# Patient Record
Sex: FEMALE | Race: WHITE | Employment: OTHER | URBAN - METROPOLITAN AREA
[De-identification: names, ages, dates, MRNs, and addresses within clinical notes are randomized per-mention and may not be internally consistent; named-entity substitution may affect disease eponyms.]

---

## 2023-02-22 RX ORDER — VALSARTAN 160 MG/1
160 TABLET ORAL NIGHTLY
COMMUNITY

## 2023-02-22 RX ORDER — FAMOTIDINE 40 MG/1
40 TABLET, FILM COATED ORAL 2 TIMES DAILY
COMMUNITY

## 2023-02-22 RX ORDER — HYDROCHLOROTHIAZIDE 25 MG/1
25 TABLET ORAL DAILY
COMMUNITY

## 2023-02-22 RX ORDER — ASPIRIN 81 MG/1
81 TABLET ORAL DAILY
COMMUNITY

## 2023-02-22 RX ORDER — LATANOPROST 50 UG/ML
1 SOLUTION/ DROPS OPHTHALMIC NIGHTLY
COMMUNITY

## 2023-02-22 RX ORDER — MAGNESIUM OXIDE 400 MG/1
400 TABLET ORAL DAILY
COMMUNITY

## 2023-02-22 RX ORDER — ATORVASTATIN CALCIUM 40 MG/1
40 TABLET, FILM COATED ORAL DAILY
COMMUNITY

## 2023-02-22 RX ORDER — ASCORBIC ACID 500 MG
1000 TABLET ORAL DAILY
COMMUNITY

## 2023-02-22 RX ORDER — DOXEPIN HYDROCHLORIDE 50 MG/1
50 CAPSULE ORAL NIGHTLY
COMMUNITY

## 2023-02-22 RX ORDER — ALPRAZOLAM 0.25 MG/1
0.25 TABLET ORAL 2 TIMES DAILY
COMMUNITY

## 2023-02-22 RX ORDER — AMLODIPINE BESYLATE 5 MG/1
5 TABLET ORAL DAILY
COMMUNITY

## 2023-02-22 NOTE — PROGRESS NOTES
Patient __X_ reached   _____not reached-preop instructions left on voice mail_____________      DATE_3/6/23_______ TIME__1045______ARRIVAL__0915  FEC_______      Nothing to eat or drink after midnight the night before,except for what the prep instructions call for. If you do not have the instructions or do not understand them please contact your doctors office. Follow any instructions your doctors office has given you including what medications to take the AM of your procedure and which ones to hold. You may use your inhalers - bring rescue inhalers with you DOS. If you take a long acting insulin the selina prior please cut the dose in half and take no diabetic medications that AM.Follow specific doctors office instructions regarding blood thinners and if they want you to hold and for how long. If you are on a blood thinner and have no instructions please contact the office and ask. Dress comfortably,bring your insurance card,picture ID,and a complete list of medications, including supplements. You must have a responsible adult to stay with you during the procedure,drive you home and stay with you. [facility] phone number [phone] for any questions. OTHER INSTRUCTIONS(if applicable)_____take amlodipine, xanax prn am of procedure____________________________________________________        VISITOR POLICY(subject to change)    Current visitor policy is 2 visitors per patient. No children allowed. Mask at discretion of facility. Visiting hours are 8a-8p. Overnight visitors will be at the discretion of the nurse. All policies are subject to change.

## 2023-03-06 ENCOUNTER — ANESTHESIA (OUTPATIENT)
Dept: ENDOSCOPY | Age: 78
End: 2023-03-06
Payer: MEDICARE

## 2023-03-06 ENCOUNTER — HOSPITAL ENCOUNTER (OUTPATIENT)
Age: 78
Setting detail: OUTPATIENT SURGERY
Discharge: HOME | End: 2023-03-06
Attending: INTERNAL MEDICINE | Admitting: INTERNAL MEDICINE
Payer: MEDICARE

## 2023-03-06 ENCOUNTER — ANESTHESIA EVENT (OUTPATIENT)
Dept: ENDOSCOPY | Age: 78
End: 2023-03-06
Payer: MEDICARE

## 2023-03-06 VITALS
BODY MASS INDEX: 22.02 KG/M2 | HEIGHT: 66 IN | TEMPERATURE: 97.6 F | SYSTOLIC BLOOD PRESSURE: 114 MMHG | DIASTOLIC BLOOD PRESSURE: 78 MMHG | WEIGHT: 137 LBS | RESPIRATION RATE: 16 BRPM | HEART RATE: 77 BPM | OXYGEN SATURATION: 99 %

## 2023-03-06 DIAGNOSIS — R10.9 ABDOMINAL PAIN, UNSPECIFIED ABDOMINAL LOCATION: ICD-10-CM

## 2023-03-06 PROCEDURE — 2580000003 HC RX 258: Performed by: NURSE ANESTHETIST, CERTIFIED REGISTERED

## 2023-03-06 PROCEDURE — 3700000001 HC ADD 15 MINUTES (ANESTHESIA): Performed by: INTERNAL MEDICINE

## 2023-03-06 PROCEDURE — 2709999900 HC NON-CHARGEABLE SUPPLY: Performed by: INTERNAL MEDICINE

## 2023-03-06 PROCEDURE — 3700000000 HC ANESTHESIA ATTENDED CARE: Performed by: INTERNAL MEDICINE

## 2023-03-06 PROCEDURE — 2500000003 HC RX 250 WO HCPCS: Performed by: NURSE ANESTHETIST, CERTIFIED REGISTERED

## 2023-03-06 PROCEDURE — 7100000011 HC PHASE II RECOVERY - ADDTL 15 MIN: Performed by: INTERNAL MEDICINE

## 2023-03-06 PROCEDURE — 88305 TISSUE EXAM BY PATHOLOGIST: CPT

## 2023-03-06 PROCEDURE — 3609010600 HC COLONOSCOPY POLYPECTOMY SNARE/COLD BIOPSY: Performed by: INTERNAL MEDICINE

## 2023-03-06 PROCEDURE — 6360000002 HC RX W HCPCS: Performed by: NURSE ANESTHETIST, CERTIFIED REGISTERED

## 2023-03-06 PROCEDURE — 7100000010 HC PHASE II RECOVERY - FIRST 15 MIN: Performed by: INTERNAL MEDICINE

## 2023-03-06 RX ORDER — SODIUM CHLORIDE 9 MG/ML
INJECTION, SOLUTION INTRAVENOUS CONTINUOUS PRN
Status: DISCONTINUED | OUTPATIENT
Start: 2023-03-06 | End: 2023-03-06 | Stop reason: SDUPTHER

## 2023-03-06 RX ORDER — LIDOCAINE HYDROCHLORIDE 20 MG/ML
INJECTION, SOLUTION INFILTRATION; PERINEURAL PRN
Status: DISCONTINUED | OUTPATIENT
Start: 2023-03-06 | End: 2023-03-06 | Stop reason: SDUPTHER

## 2023-03-06 RX ORDER — PROPOFOL 10 MG/ML
INJECTION, EMULSION INTRAVENOUS PRN
Status: DISCONTINUED | OUTPATIENT
Start: 2023-03-06 | End: 2023-03-06 | Stop reason: SDUPTHER

## 2023-03-06 RX ADMIN — PROPOFOL 40 MG: 10 INJECTION, EMULSION INTRAVENOUS at 10:52

## 2023-03-06 RX ADMIN — PROPOFOL 20 MG: 10 INJECTION, EMULSION INTRAVENOUS at 11:10

## 2023-03-06 RX ADMIN — PROPOFOL 20 MG: 10 INJECTION, EMULSION INTRAVENOUS at 10:57

## 2023-03-06 RX ADMIN — PROPOFOL 80 MG: 10 INJECTION, EMULSION INTRAVENOUS at 10:49

## 2023-03-06 RX ADMIN — PROPOFOL 20 MG: 10 INJECTION, EMULSION INTRAVENOUS at 10:56

## 2023-03-06 RX ADMIN — PROPOFOL 20 MG: 10 INJECTION, EMULSION INTRAVENOUS at 11:04

## 2023-03-06 RX ADMIN — SODIUM CHLORIDE: 9 INJECTION, SOLUTION INTRAVENOUS at 10:41

## 2023-03-06 RX ADMIN — PROPOFOL 20 MG: 10 INJECTION, EMULSION INTRAVENOUS at 11:01

## 2023-03-06 RX ADMIN — PROPOFOL 20 MG: 10 INJECTION, EMULSION INTRAVENOUS at 10:59

## 2023-03-06 RX ADMIN — PROPOFOL 20 MG: 10 INJECTION, EMULSION INTRAVENOUS at 11:06

## 2023-03-06 RX ADMIN — PROPOFOL 20 MG: 10 INJECTION, EMULSION INTRAVENOUS at 10:54

## 2023-03-06 RX ADMIN — PROPOFOL 20 MG: 10 INJECTION, EMULSION INTRAVENOUS at 10:55

## 2023-03-06 RX ADMIN — PROPOFOL 20 MG: 10 INJECTION, EMULSION INTRAVENOUS at 10:53

## 2023-03-06 RX ADMIN — LIDOCAINE HYDROCHLORIDE 100 MG: 20 INJECTION, SOLUTION INFILTRATION; PERINEURAL at 10:49

## 2023-03-06 RX ADMIN — PROPOFOL 20 MG: 10 INJECTION, EMULSION INTRAVENOUS at 11:07

## 2023-03-06 RX ADMIN — PROPOFOL 20 MG: 10 INJECTION, EMULSION INTRAVENOUS at 11:02

## 2023-03-06 RX ADMIN — PROPOFOL 20 MG: 10 INJECTION, EMULSION INTRAVENOUS at 10:50

## 2023-03-06 RX ADMIN — PROPOFOL 20 MG: 10 INJECTION, EMULSION INTRAVENOUS at 11:08

## 2023-03-06 RX ADMIN — PROPOFOL 10 MG: 10 INJECTION, EMULSION INTRAVENOUS at 11:12

## 2023-03-06 RX ADMIN — PROPOFOL 20 MG: 10 INJECTION, EMULSION INTRAVENOUS at 10:51

## 2023-03-06 ASSESSMENT — LIFESTYLE VARIABLES: SMOKING_STATUS: 1

## 2023-03-06 ASSESSMENT — PAIN - FUNCTIONAL ASSESSMENT: PAIN_FUNCTIONAL_ASSESSMENT: 0-10

## 2023-03-06 NOTE — ANESTHESIA POSTPROCEDURE EVALUATION
Department of Anesthesiology  Postprocedure Note    Patient: Gilberto Mares  MRN: [id number]  YOB: 1945  Date of evaluation: 3/6/2023      Procedure Summary     Date: 03/06/23 Room / Location: [address]    Anesthesia Start: 1046 Anesthesia Stop: 1119    Procedure: COLONOSCOPY POLYPECTOMY SNARE/COLD BIOPSY Diagnosis:       Abdominal pain, unspecified abdominal location      (Abdominal pain, unspecified abdominal location [R10.9])    Surgeons: Graciela Paz MD Responsible Provider: Luis Felipe Hall MD    Anesthesia Type: MAC ASA Status: 2          Anesthesia Type: No value filed.     Marcus Phase I: Marcus Score: 10    Marcus Phase II: Marcus Score: 8      Anesthesia Post Evaluation    Patient location during evaluation: PACU  Patient participation: complete - patient participated  Level of consciousness: awake and alert  Airway patency: patent  Nausea & Vomiting: no vomiting and no nausea  Complications: no  Cardiovascular status: hemodynamically stable  Respiratory status: acceptable  Hydration status: stable

## 2023-03-06 NOTE — H&P
Gastroenterology Note             Pre-operative History and Physical    Patient: Ankit Rome  : 1945  CSN:     History Obtained From:  patient and/or guardian. HISTORY OF PRESENT ILLNESS:    The patient is a 78 y.o. female  here for colonoscopy. Here for colonoscopy. Recent episode of small bowel obstruction of unclear etiology. Symptoms lasted for 24 hours or most likely viral gastroenteritis could have caused her symptoms. Last colonoscopy was more than 10 years back. Past Medical History:    Past Medical History:   Diagnosis Date    Anxiety     GERD (gastroesophageal reflux disease)     Hyperlipidemia     Hypertension      Past Surgical History:    Past Surgical History:   Procedure Laterality Date    BREAST LUMPECTOMY Left     treated with radiation    MANDIBLE SURGERY      dislocated jaw    TONSILLECTOMY       Medications Prior to Admission:   No current facility-administered medications on file prior to encounter. Current Outpatient Medications on File Prior to Encounter   Medication Sig Dispense Refill    magnesium oxide (MAG-OX) 400 MG tablet Take 400 mg by mouth daily      hydroCHLOROthiazide (HYDRODIURIL) 25 MG tablet Take 25 mg by mouth daily      famotidine (PEPCID) 40 MG tablet Take 40 mg by mouth 2 times daily      atorvastatin (LIPITOR) 40 MG tablet Take 40 mg by mouth daily      doxepin (SINEQUAN) 50 MG capsule Take 50 mg by mouth nightly      aspirin 81 MG EC tablet Take 81 mg by mouth daily      amLODIPine (NORVASC) 5 MG tablet Take 5 mg by mouth daily      valsartan (DIOVAN) 160 MG tablet Take 160 mg by mouth at bedtime      ALPRAZolam (XANAX) 0.25 MG tablet Take 0.25 mg by mouth in the morning and at bedtime.       Cholecalciferol (VITAMIN D-3 PO) Take 10,000 Units by mouth once a week      latanoprost (XALATAN) 0.005 % ophthalmic solution Place 1 drop into both eyes nightly      Calcium Carbonate-Vit D-Min (CALCIUM 1200 PO) Take by mouth in the morning and at bedtime      vitamin C (ASCORBIC ACID) 500 MG tablet Take 1,000 mg by mouth daily          Allergies:  Tetracyclines & related      Social History:   Social History     Tobacco Use    Smoking status: Every Day     Packs/day: 0.25     Years: 55.00     Pack years: 13.75     Types: Cigarettes    Smokeless tobacco: Not on file   Substance Use Topics    Alcohol use: Not on file     Family History:   History reviewed. No pertinent family history. PHYSICAL EXAM:      /60   Pulse 83   Temp 98.3 °F (36.8 °C) (Temporal)   Resp 16   Ht 5' 6\" (1.676 m)   Wt 137 lb (62.1 kg)   SpO2 98%   BMI 22.11 kg/m²  I        Heart:   RRR, normal s1s2    Lungs:  CTA bilat,  Normal effort    Abdomen:   NT, ND      ASA Grade:  ASA 2 - Patient with mild systemic disease with no functional limitations    Mallampati Class: 2          ASSESSMENT AND PLAN:    1. Patient is a 78 y.o. female here for Colonoscopy with MAC.   2.  Procedure options, risks and benefits reviewed with patient. Patient expresses understanding.     Eduardo Henderson MD,   [facility]  3/6/2023

## 2023-03-06 NOTE — DISCHARGE INSTRUCTIONS
Impression:   -Cecal polyp (7 mm)  -Rectal  polyp (6 mm)     Recommendations:    -Await pathology result  -Follow-up with referring MD  -Repeat  colonoscopy in 5 years if health permits     Stevan Omalley MD, [facility]  [phone]  3/6/2023      COLONSCOPY DISCHARGE INSTRUCTIONS    You may experience some lightheadedness for the next several hours. Plan on quiet relaxation for the rest of today. Nap for four hours following procedure if possible. A responsible adult needs to stay with you today. Eat bland food and avoid anything greasy or spicy initially-progress to your normal diet gradually. Diet restrictions as instructed. You may resume home medications as instructed. It is not unusual to experience some mild cramping or gas pains, and you may not have a bowel movement for several days. If you had a polyp removed, avoid strenuous activity for 48 hours. Avoid the use of aspirin or related compounds for one week, unless otherwise instructed by your physician. You may notice a small amount of blood in your next few bowel movements, but if a large amount passes, call your physician. If you have any of the following problems, notify your physician or return to the hospital emergency room : fever, chills, excessive bleeding, excessive vomiting, difficulty swallowing, uncontrolled pain, increased abdominal distention, shortness of breath or any other problems. Call your doctor at [phone] if you have any concerns. If you had any biopsies or polyps call for results in 5-7 business days. Follow up colonoscopy in 5 years, health permitting. See your physician's report for details about your procedure and recommendations. ANESTHESIA DISCHARGE INSTRUCTIONS    Wear your seatbelt home. You are under the influence of drugs-do not drink alcohol, drive ,operate machinery,or make any important decisions or sign any legal documentsfor 24 hours.  You may resume normal activities tomorrow.  A responsible adult needs to be with you for 24 hours.  You may experience lightheadedness,dizziness,or sleepiness following surgery.    Rest at home today- increase activity as tolerated. It is recommended to take a four hour nap after procedure.  Progress slowly to a regular diet unless your physician has instructed you otherwise. Avoid spicy and greasy food on first meal.  Drink plenty of water.  If nausea becomes a problem call your physician.    Call your doctor if concerns arise.

## 2023-03-06 NOTE — PROCEDURES
Colonoscopy Procedure  Note          Patient: Maggie Kelly  : 1945  CSN:     Procedure: Colonoscopy with polypectomy (cold snare)    Date:  3/6/2023    Surgeon:  Prabhakar Macias MD, MD    Referring Provider:      Preoperative Diagnosis:  screening    Postoperative Diagnosis:    -Cecal polyp (7 mm)  -Rectal  polyp (6 mm)    Anesthesia:  Propofol sedation  EBL: <5 mL    Indications: This is a 78y.o. year old female who presents today with colonoscopy. Last procedure more than 10 years back. She recently had episode of small bowel obstruction. Procedure: An informed consent was obtained from the patient after explanation of indications, benefits, possible risks and complications of the procedure. The patient was then taken to the endoscopy suite, placed in the left lateral decubitus position, and the above IV anesthesia was administered. With the patient in left lateral decubitus position the endoscope was inserted through the anorectal area into the rectum. The scope was then advanced through the length of the colon to the cecum. The ileocecal valve was visualized and cannulated. The quality of preparation was adequate. Water was insufflated through the biopsy channel to clean out the colon and look at the underlying mucosa. The scope was carefully withdrawn and found to be normal.    Digital rectal examination was performed, no abnormalities noted. She had tortuous colon. The scope was advanced all the way to the cecum, the mucosa appeared normal.  Appendix was identified. There was an 8 mm polyp along the side of the appendiceal orifice was removed with cold snare. The terminal ileum was briefly intubated, the mucosa appeared normal.  The mucosa in the ascending, transverse, descending, sigmoid and rectum appeared normal.  On retroflexion, rectal polyp close to anal verge noted and removed with cold snare.    The scope was straightened, the colon was decompressed and the scope was withdrawn from the patient.     The patient tolerated the procedure well and was taken to the PACU in good condition.    Impression:   -Cecal polyp (7 mm)  -Rectal  polyp (6 mm)    Recommendations:    -Await pathology result  -Follow-up with referring MD  -Repeat  colonoscopy in 5 years if health permits    JUNE MORENO MD, MD   [facility]  3/6/2023

## 2023-03-06 NOTE — ANESTHESIA PRE PROCEDURE
Department of Anesthesiology  Preprocedure Note       Name:  Gilberto Mares   Age:  78 y.o.  :  1945                                          MRN:  [id number]         Date:  3/6/2023      Surgeon: Edenilson Mead):  Graciela Paz MD    Procedure: Procedure(s):  COLONOSCOPY DIAGNOSTIC    Medications prior to admission:   Prior to Admission medications    Medication Sig Start Date End Date Taking? Authorizing Provider   magnesium oxide (MAG-OX) 400 MG tablet Take 400 mg by mouth daily   Yes Historical Provider, MD   hydroCHLOROthiazide (HYDRODIURIL) 25 MG tablet Take 25 mg by mouth daily   Yes Historical Provider, MD   famotidine (PEPCID) 40 MG tablet Take 40 mg by mouth 2 times daily   Yes Historical Provider, MD   atorvastatin (LIPITOR) 40 MG tablet Take 40 mg by mouth daily   Yes Historical Provider, MD   doxepin (SINEQUAN) 50 MG capsule Take 50 mg by mouth nightly   Yes Historical Provider, MD   aspirin 81 MG EC tablet Take 81 mg by mouth daily   Yes Historical Provider, MD   amLODIPine (NORVASC) 5 MG tablet Take 5 mg by mouth daily   Yes Historical Provider, MD   valsartan (DIOVAN) 160 MG tablet Take 160 mg by mouth at bedtime   Yes Historical Provider, MD   ALPRAZolam (XANAX) 0.25 MG tablet Take 0.25 mg by mouth in the morning and at bedtime. Yes Historical Provider, MD   Cholecalciferol (VITAMIN D-3 PO) Take 10,000 Units by mouth once a week   Yes Historical Provider, MD   latanoprost (XALATAN) 0.005 % ophthalmic solution Place 1 drop into both eyes nightly   Yes Historical Provider, MD   Calcium Carbonate-Vit D-Min (CALCIUM 1200 PO) Take by mouth in the morning and at bedtime   Yes Historical Provider, MD   vitamin C (ASCORBIC ACID) 500 MG tablet Take 1,000 mg by mouth daily   Yes Historical Provider, MD       Current medications:    No current facility-administered medications for this encounter. Allergies:     Allergies   Allergen Reactions    Tetracyclines & Related      Severe abdominal pain Problem List:  There is no problem list on file for this patient. Past Medical History:        Diagnosis Date    Anxiety     GERD (gastroesophageal reflux disease)     Hyperlipidemia     Hypertension        Past Surgical History:        Procedure Laterality Date    BREAST LUMPECTOMY Left     treated with radiation    MANDIBLE SURGERY      dislocated jaw    TONSILLECTOMY         Social History:    Social History     Tobacco Use    Smoking status: Every Day     Packs/day: 0.25     Years: 55.00     Pack years: 13.75     Types: Cigarettes    Smokeless tobacco: Not on file   Substance Use Topics    Alcohol use: Not on file                                Ready to quit: Not Answered  Counseling given: Not Answered      Vital Signs (Current):   Vitals:    02/21/23 1630   Weight: 137 lb (62.1 kg)   Height: 5' 6\" (1.676 m)                                              BP Readings from Last 3 Encounters:   No data found for BP       NPO Status:                                                                                 BMI:   Wt Readings from Last 3 Encounters:   02/21/23 137 lb (62.1 kg)     Body mass index is 22.11 kg/m². CBC: No results found for: WBC, RBC, HGB, HCT, MCV, RDW, PLT    CMP: No results found for: NA, K, CL, CO2, BUN, CREATININE, GFRAA, AGRATIO, LABGLOM, GLUCOSE, GLU, PROT, CALCIUM, BILITOT, ALKPHOS, AST, ALT    POC Tests: No results for input(s): POCGLU, POCNA, POCK, POCCL, POCBUN, POCHEMO, POCHCT in the last 72 hours.     Coags: No results found for: PROTIME, INR, APTT    HCG (If Applicable): No results found for: PREGTESTUR, PREGSERUM, HCG, HCGQUANT     ABGs: No results found for: PHART, PO2ART, LPX3DDF, JEE6KKU, BEART, [id number]     Type & Screen (If Applicable):  No results found for: LABABO, LABRH    Drug/Infectious Status (If Applicable):  No results found for: HIV, HEPCAB    COVID-19 Screening (If Applicable): No results found for: COVID19        Anesthesia Evaluation  Patient summary reviewed and Nursing notes reviewed  Airway: Mallampati: II  TM distance: >3 FB   Neck ROM: full  Mouth opening: > = 3 FB   Dental:          Pulmonary:   (+) current smoker                           Cardiovascular:  Exercise tolerance: poor (<4 METS),   (+) hypertension: moderate,                   Neuro/Psych:   (+) depression/anxiety             GI/Hepatic/Renal:   (+) GERD: well controlled,           Endo/Other:                     Abdominal:             Vascular: Other Findings:           Anesthesia Plan      MAC     ASA 2       Induction: intravenous. MIPS: Prophylactic antiemetics administered. Anesthetic plan and risks discussed with patient. Plan discussed with CRNA.                     Ann Montaño MD   3/6/2023

## (undated) DEVICE — SNARES COLD OVAL 10MM THIN

## (undated) DEVICE — ENDOSCOPIC KIT 6X3/16 FT COLON W/ 1.1 OZ 2 GWN W/O BRSH

## (undated) DEVICE — AIR/WATER CLEANING ADAPTER FOR OLYMPUS® GI ENDOSCOPE: Brand: BULLDOG®

## (undated) DEVICE — BW-412T DISP COMBO CLEANING BRUSH: Brand: SINGLE USE COMBINATION CLEANING BRUSH

## (undated) DEVICE — TRAP SPEC RETRV CLR PLAS POLYP IN LN SUCT QUIK CTCH

## (undated) DEVICE — VALVE SUCTION AIR H2O SET ORCA POD + DISP

## (undated) DEVICE — SOLUTION IV IRRIG WATER 500ML POUR BRL ST 2F7113